# Patient Record
Sex: MALE | Race: WHITE | NOT HISPANIC OR LATINO
[De-identification: names, ages, dates, MRNs, and addresses within clinical notes are randomized per-mention and may not be internally consistent; named-entity substitution may affect disease eponyms.]

---

## 2019-06-04 PROBLEM — Z00.00 ENCOUNTER FOR PREVENTIVE HEALTH EXAMINATION: Status: ACTIVE | Noted: 2019-06-04

## 2019-06-19 ENCOUNTER — APPOINTMENT (OUTPATIENT)
Dept: ORTHOPEDIC SURGERY | Facility: CLINIC | Age: 39
End: 2019-06-19

## 2023-04-05 ENCOUNTER — APPOINTMENT (OUTPATIENT)
Dept: ORTHOPEDIC SURGERY | Facility: CLINIC | Age: 43
End: 2023-04-05
Payer: COMMERCIAL

## 2023-04-05 VITALS
DIASTOLIC BLOOD PRESSURE: 95 MMHG | BODY MASS INDEX: 35.21 KG/M2 | SYSTOLIC BLOOD PRESSURE: 152 MMHG | HEART RATE: 90 BPM | WEIGHT: 260 LBS | HEIGHT: 72 IN

## 2023-04-05 PROCEDURE — 73564 X-RAY EXAM KNEE 4 OR MORE: CPT | Mod: LT

## 2023-04-05 PROCEDURE — 99203 OFFICE O/P NEW LOW 30 MIN: CPT

## 2023-04-05 NOTE — PHYSICAL EXAM
[de-identified] : GENERAL APPEARANCE: Well nourished and hydrated, pleasant, alert, and oriented x 3. Appears their stated age. \par HEENT: Normocephalic, extraocular eye motion intact. Nasal septum midline. Oral cavity clear. External auditory canal clear. \par RESPIRATORY: Breath sounds clear and audible in all lobes. No wheezing, No accessory muscle use.\par CARDIOVASCULAR: No apparent abnormalities. No lower leg edema. No varicosities. Pedal pulses are palpable.\par NEUROLOGIC: Sensation is normal, no muscle weakness in the upper or lower extremities.\par DERMATOLOGIC: No apparent skin lesions, moist, warm, no rash.\par SPINE: Cervical spine appears normal and moves freely; thoracic spine appears normal and moves freely; lumbosacral spine appears normal and moves freely, normal, nontender.\par MUSCULOSKELETAL: Hands, wrists, and elbows are normal and move freely, shoulders are normal and move freely. \par Psychiatric: Oriented to person, place, and time, insight and judgement were intact and the affect was normal. \par Musculoskeletal:. Left knee exam shows no effusion, ROM is 0-1 30 degrees, no instability, no pain with Alonzo, no joint line tenderness.  There is a palpable mass approximately 2 x 2 cm on the medial aspect of the knee that is painful to the touch.  No skin changes.  No erythema.\par 5/5 motor strength in bilateral lower extremities. Sensory: Intact in bilateral lower extremities. DTRs: Biceps, brachioradialis, triceps, patellar, ankle and plantar 2+ and symmetric bilaterally. Pulses: dorsalis pedis, posterior tibial, femoral, popliteal, and radial 2+ and symmetric bilaterally. \par Constitutional: Alert and in no acute distress, but well-appearing.  [de-identified] : 4 views left knee obtained the office today show no acute fracture or dislocation.  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  Nonaggressive appearance.

## 2023-04-05 NOTE — HISTORY OF PRESENT ILLNESS
[Stable] : stable [0] : a maximum pain level of 0/10 [Rarely] : ~He/She~ states the symptoms seem to be rarely occuring [Direct Pressure] : worsened by direct pressure [de-identified] : 42 year old male with past medical attention presents to office for initial evaluation of a tender palpable growth on the medial aspect of his left knee that has been present for many years.  Patient states that this does not cause any dysfunction to his life but has been ongoing long enough that he would like to get it taken care of.  He is able to walk, navigate stairs, stand is flex his knee and go about his activities of daily living without any issue.  He has discomfort when he bangs the growth or applies direct pressure.  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  The patient does not take any medication as he does not have any pain, denies use of assistive device for ambulation, denies any recent injuries falls or trauma, numbness/tingling, swelling, locking catching or buckling of the knee. \par \par Admits to smoking half pack of cigarettes a day and denies any drug or alcohol use.

## 2023-04-05 NOTE — DISCUSSION/SUMMARY
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  He had this lesion for many years however it is painful to the touch and is causing him significant distress at this time.  He would like to have it excised and I do think that is indicated.  I have therefore given him a referral over to the orthopedic oncology service and Dr. Marcial Shabazz for further evaluation and management and removal of this nerve sheath tumor.  Patient understands and agreement.  He will take Tylenol and NSAIDs as needed for any pain.  I will see him back on an as-needed basis for his left knee.  All questions asked and answered

## 2023-04-20 ENCOUNTER — APPOINTMENT (OUTPATIENT)
Dept: ORTHOPEDIC SURGERY | Facility: CLINIC | Age: 43
End: 2023-04-20
Payer: COMMERCIAL

## 2023-04-20 PROCEDURE — 99214 OFFICE O/P EST MOD 30 MIN: CPT

## 2023-04-21 NOTE — PHYSICAL EXAM
[FreeTextEntry1] : UponOn exam patient stands in good balance.  He has a 1/2 to 2 cm mass under the skin at the anteromedial portion of the right knee below the patella.  Tenderness with palpation.  Negative Tinel's with regard to shooting type pain or any radiation.  It is more of an aching type pain.  He has no popliteal or inguinal lymphadenopathy.  His range of motion is normal he is otherwise neurovascular intact.  He has no other masses elsewhere. [General Appearance - Well-Appearing] : Well appearing [General Appearance - Well Nourished] : well nourished [Oriented To Time, Place, And Person] : Oriented to person, place, and time [Impaired Insight] : Insight and judgment were intact [Affect] : The affect was normal. [Sclera] : the sclera and conjunctiva were normal [Mood] : the mood was normal [Neck Cervical Mass (___cm)] : no neck mass was observed [Heart Rate And Rhythm] : heart rate was normal and rhythm regular [] : No respiratory distress [Normal Station and Gait] : gait and station were normal [Abdomen Soft] : Soft [Tenderness] : tenderness [Swelling] : swelling [Masses] : masses [Skin Changes - Describe changes:] : No skin changes noted [Full ROM Unless otherwise noted:] : Full range of motion unless otherwise noted: [LE  Motor Strength Normal unless otherwise noted:] : 5/5 strength in bilateral lower extemities unless otherwise noted. [Normal] : Sensation intact to light touch. [2+] : left 2+

## 2023-04-21 NOTE — DISCUSSION/SUMMARY
[Surgical risks reviewed] : Surgical risks reviewed [All Questions Answered] : Patient (and family) had all questions answered to an agreeable level of satisfaction [Interested in Proceeding] : Patient (and family) expressed understanding and interest in proceeding with the plan as outlined [de-identified] : Patient is a painful mass in this area.  This could be a neurofibroma versus a schwannoma.  Also could be a leiomyoma versus fibrolipoma or other benign soft tissue mass.  There is also chance of malignancy.  I think this is a low chance as this has been there for many years with minimal change.  We discussed we can do this with biopsy and resection at the same time assuming that it appears benign.  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  As we have time and his MRI is 8 months old I will get a new MRI and this was ordered already.  Follow-up again in time for surgery.\par \par If imaging was ordered, the patient was told to make an appointment to review findings right after all imaging is completed.\par \par We discussed risks, benefits and alternatives. Rationale of care was reviewed and all questions were answered. Patient (and family) had all questions answered to her degree of the level of satisfaction. Patient (and family) expressed understanding and interest in proceeding with the plan as outlined.\par \par \par \par \par This note was done with a voice recognition transcription software and any typos are related to this rather than medical error. Surgical risks reviewed. Patient (and family) had all questions answered to an agreeable level of satisfaction. Patient (and family) expressed understanding and interest in proceeding with the plan as outlined.  \par

## 2023-04-21 NOTE — REVIEW OF SYSTEMS
[Feeling Tired] : not feeling tired [Joint Pain] : joint pain [Joint Stiffness] : no joint stiffness [Joint Swelling] : no joint swelling [Nl] : Hematologic/Lymphatic

## 2023-04-21 NOTE — HISTORY OF PRESENT ILLNESS
[FreeTextEntry1] : This is a 42-year-old gentleman who has known about a mass in the front of his left knee for approximately 6 or 7 years.  He had an MRI back in 2019 as well as another one in August 2022 prior he thinks the mass is slightly grown in size.  It does cause significant pain when it is pushed.  He has negative radiation of the pain.  It is slowly been bothering him for significant amount of time however he never got it dealt with because of timing as well as because of COVID.  He is now here for further evaluation.  It does bother him when he pushes on it and when he goes down onto his knees.  He does not want invention which similarly because of it.  When it is not touched in general it is not as painful. [___ yrs] : [unfilled] year(s) ago [5] : currently ~his/her~ pain is 5 out of 10 [Bending] : worsened by bending [None] : No relieving factors are noted

## 2023-04-21 NOTE — DATA REVIEWED
[de-identified] : X-rays reviewed from a few weeks ago bilateral knees show minimal degenerative changes.  There is no soft tissue shadow or any calcifications in the area of concern.  MRI scan from 8/29/2022 compared to that from June 2019 shows a 1.5 x 1 cm mass in the anteromedial deep subcutaneous tissues.  This is slightly growing compared to before.  This was thought to be most consistent with a nerve sheath tumor [Imaging Present] : Present

## 2023-06-14 ENCOUNTER — APPOINTMENT (OUTPATIENT)
Dept: ORTHOPEDIC SURGERY | Facility: CLINIC | Age: 43
End: 2023-06-14
Payer: COMMERCIAL

## 2023-06-14 PROCEDURE — 99214 OFFICE O/P EST MOD 30 MIN: CPT

## 2023-06-14 NOTE — DATA REVIEWED
[Imaging Present] : Present [de-identified] : MRI from May 9, 2023 shows the same lesion as before.  It is a well-circumscribed heterogeneous enhancing subcutaneous nodule.  It is virtually unchanged since August 20, 2022.

## 2023-06-14 NOTE — DISCUSSION/SUMMARY
[Surgical risks reviewed] : Surgical risks reviewed [All Questions Answered] : Patient (and family) had all questions answered to an agreeable level of satisfaction [Interested in Proceeding] : Patient (and family) expressed understanding and interest in proceeding with the plan as outlined [de-identified] : As we discussed this lesion could be schwannoma versus vascular likely lipoma versus other nonaggressive lesion.  At the size and location with no change over 8 months it is likely benign however he would still do primary wide excision.  We did send this out to get pathologic evaluation.  He is interested in doing surgery.  We will do this in the near future.  We discussed risk of numbness from the infrapatellar branch of the saphenous nerve in the front of the knee anterolaterally.\par \par If imaging was ordered, the patient was told to make an appointment to review findings right after all imaging is completed.\par \par We discussed risks, benefits and alternatives. Rationale of care was reviewed and all questions were answered. Patient (and family) had all questions answered to her degree of the level of satisfaction. Patient (and family) expressed understanding and interest in proceeding with the plan as outlined.\par \par \par \par \par This note was done with a voice recognition transcription software and any typos are related to this rather than medical error. Surgical risks reviewed. Patient (and family) had all questions answered to an agreeable level of satisfaction. Patient (and family) expressed understanding and interest in proceeding with the plan as outlined.  \par

## 2023-06-14 NOTE — HISTORY OF PRESENT ILLNESS
[FreeTextEntry1] : Overall his symptoms are exactly the same.  He has no other issues.  He had his MRI to follow-up between August and May. [Stable] : stable [2] : currently ~his/her~ pain is 2 out of 10

## 2023-06-14 NOTE — PHYSICAL EXAM
[FreeTextEntry1] : UponOn exam patient stands in good balance.  He has a 1/2 to 2 cm mass under the skin at the anteromedial portion of the right knee below the patella.  Tenderness with palpation.  Negative Tinel's with regard to shooting type pain or any radiation.  It is more of an aching type pain.  He has no popliteal or inguinal lymphadenopathy.  His range of motion is normal he is otherwise neurovascular intact.  He has no other masses elsewhere. [General Appearance - Well-Appearing] : Well appearing [Oriented To Time, Place, And Person] : Oriented to person, place, and time [General Appearance - Well Nourished] : well nourished [Impaired Insight] : Insight and judgment were intact [Affect] : The affect was normal. [Mood] : the mood was normal [Sclera] : the sclera and conjunctiva were normal [Neck Cervical Mass (___cm)] : no neck mass was observed [Heart Rate And Rhythm] : heart rate was normal and rhythm regular [] : No respiratory distress [Abdomen Soft] : Soft [Normal Station and Gait] : gait and station were normal [Tenderness] : tenderness [Swelling] : swelling [Masses] : masses [Skin Changes - Describe changes:] : No skin changes noted [Full ROM Unless otherwise noted:] : Full range of motion unless otherwise noted: [LE  Motor Strength Normal unless otherwise noted:] : 5/5 strength in bilateral lower extemities unless otherwise noted. [Normal] : Sensation intact to light touch. [2+] : left 2+

## 2023-06-26 ENCOUNTER — OUTPATIENT (OUTPATIENT)
Dept: OUTPATIENT SERVICES | Facility: HOSPITAL | Age: 43
LOS: 1 days | End: 2023-06-26
Payer: COMMERCIAL

## 2023-06-26 VITALS
SYSTOLIC BLOOD PRESSURE: 145 MMHG | OXYGEN SATURATION: 99 % | RESPIRATION RATE: 16 BRPM | TEMPERATURE: 97 F | WEIGHT: 285.94 LBS | HEIGHT: 72 IN | DIASTOLIC BLOOD PRESSURE: 95 MMHG | HEART RATE: 80 BPM

## 2023-06-26 DIAGNOSIS — D49.2 NEOPLASM OF UNSPECIFIED BEHAVIOR OF BONE, SOFT TISSUE, AND SKIN: ICD-10-CM

## 2023-06-26 DIAGNOSIS — I10 ESSENTIAL (PRIMARY) HYPERTENSION: ICD-10-CM

## 2023-06-26 DIAGNOSIS — B20 HUMAN IMMUNODEFICIENCY VIRUS [HIV] DISEASE: ICD-10-CM

## 2023-06-26 LAB
ALBUMIN SERPL ELPH-MCNC: 4.7 G/DL — SIGNIFICANT CHANGE UP (ref 3.3–5)
ALP SERPL-CCNC: 106 U/L — SIGNIFICANT CHANGE UP (ref 40–120)
ALT FLD-CCNC: 56 U/L — HIGH (ref 4–41)
ANION GAP SERPL CALC-SCNC: 15 MMOL/L — HIGH (ref 7–14)
AST SERPL-CCNC: 28 U/L — SIGNIFICANT CHANGE UP (ref 4–40)
BILIRUB SERPL-MCNC: 0.3 MG/DL — SIGNIFICANT CHANGE UP (ref 0.2–1.2)
BUN SERPL-MCNC: 11 MG/DL — SIGNIFICANT CHANGE UP (ref 7–23)
CALCIUM SERPL-MCNC: 10 MG/DL — SIGNIFICANT CHANGE UP (ref 8.4–10.5)
CHLORIDE SERPL-SCNC: 103 MMOL/L — SIGNIFICANT CHANGE UP (ref 98–107)
CO2 SERPL-SCNC: 24 MMOL/L — SIGNIFICANT CHANGE UP (ref 22–31)
CREAT SERPL-MCNC: 0.94 MG/DL — SIGNIFICANT CHANGE UP (ref 0.5–1.3)
EGFR: 103 ML/MIN/1.73M2 — SIGNIFICANT CHANGE UP
GLUCOSE SERPL-MCNC: 110 MG/DL — HIGH (ref 70–99)
HCT VFR BLD CALC: 47.9 % — SIGNIFICANT CHANGE UP (ref 39–50)
HGB BLD-MCNC: 16.2 G/DL — SIGNIFICANT CHANGE UP (ref 13–17)
MCHC RBC-ENTMCNC: 29.8 PG — SIGNIFICANT CHANGE UP (ref 27–34)
MCHC RBC-ENTMCNC: 33.8 GM/DL — SIGNIFICANT CHANGE UP (ref 32–36)
MCV RBC AUTO: 88.2 FL — SIGNIFICANT CHANGE UP (ref 80–100)
NRBC # BLD: 0 /100 WBCS — SIGNIFICANT CHANGE UP (ref 0–0)
NRBC # FLD: 0 K/UL — SIGNIFICANT CHANGE UP (ref 0–0)
PLATELET # BLD AUTO: 249 K/UL — SIGNIFICANT CHANGE UP (ref 150–400)
POTASSIUM SERPL-MCNC: 3.8 MMOL/L — SIGNIFICANT CHANGE UP (ref 3.5–5.3)
POTASSIUM SERPL-SCNC: 3.8 MMOL/L — SIGNIFICANT CHANGE UP (ref 3.5–5.3)
PROT SERPL-MCNC: 7.7 G/DL — SIGNIFICANT CHANGE UP (ref 6–8.3)
RBC # BLD: 5.43 M/UL — SIGNIFICANT CHANGE UP (ref 4.2–5.8)
RBC # FLD: 12.9 % — SIGNIFICANT CHANGE UP (ref 10.3–14.5)
SODIUM SERPL-SCNC: 142 MMOL/L — SIGNIFICANT CHANGE UP (ref 135–145)
WBC # BLD: 9.13 K/UL — SIGNIFICANT CHANGE UP (ref 3.8–10.5)
WBC # FLD AUTO: 9.13 K/UL — SIGNIFICANT CHANGE UP (ref 3.8–10.5)

## 2023-06-26 PROCEDURE — 93010 ELECTROCARDIOGRAM REPORT: CPT

## 2023-06-26 NOTE — H&P PST ADULT - PROBLEM SELECTOR PLAN 3
Patient instructed to descoy take with a sip of water on the morning of procedure. Patient verbalized understanding.

## 2023-06-26 NOTE — H&P PST ADULT - PROBLEM SELECTOR PLAN 1
Patient tentatively scheduled for biopsy , resection left knee mass 7/5/23.  Pre-op instructions provided. Pt given verbal and written instructions with teach back on chlorhexidine wash and pepcid. Pt verbalized understanding with return demonstration.   SAGAR precautions,

## 2023-06-26 NOTE — H&P PST ADULT - NS SC CAGE ALCOHOL CUT DOWN
Returned Stacy call. Pt unable to swallow omeprazole and levaquin. Will request liquids from MD. Also verbal order given for PT/OT. Pt c/o<100 fever today, HH nurse encouraged increased fluids, ambulation, turn cough deep breathe.    no

## 2023-06-26 NOTE — H&P PST ADULT - NSANTHOSAYNRD_GEN_A_CORE
No. SAGAR screening performed.  STOP BANG Legend: 0-2 = LOW Risk; 3-4 = INTERMEDIATE Risk; 5-8 = HIGH Risk

## 2023-06-26 NOTE — H&P PST ADULT - PROBLEM SELECTOR PLAN 2
Patient instructed to take lisinopril with a sip of water on the morning of procedure. .  Patient Blood pressure at /97 repeat 145/95.  Patient instructed to obtain medical evaluation Copy requested.  Surgeon made aware via email. Patient instructed to take lisinopril with a sip of water on the morning of procedure. .  Patient Blood pressure at /97 repeat 145/95.  Patient instructed to obtain medical evaluation Copy requested.  Surgeon made aware via email.  Comparison EKG requested.

## 2023-06-26 NOTE — H&P PST ADULT - NSICDXFAMILYHX_GEN_ALL_CORE_FT
FAMILY HISTORY:  Father  Still living? Unknown  FH: HTN (hypertension), Age at diagnosis: Age Unknown    Mother  Still living? Unknown  FH: HTN (hypertension), Age at diagnosis: Age Unknown    Grandparent  Still living? Unknown  FHx: stroke, Age at diagnosis: Age Unknown

## 2023-07-05 ENCOUNTER — APPOINTMENT (OUTPATIENT)
Dept: ORTHOPEDIC SURGERY | Facility: HOSPITAL | Age: 43
End: 2023-07-05

## 2023-07-20 ENCOUNTER — APPOINTMENT (OUTPATIENT)
Dept: ORTHOPEDIC SURGERY | Facility: CLINIC | Age: 43
End: 2023-07-20

## 2023-07-22 PROBLEM — I10 ESSENTIAL (PRIMARY) HYPERTENSION: Chronic | Status: ACTIVE | Noted: 2023-06-26

## 2023-08-01 ENCOUNTER — APPOINTMENT (OUTPATIENT)
Dept: ORTHOPEDIC SURGERY | Facility: CLINIC | Age: 43
End: 2023-08-01
Payer: COMMERCIAL

## 2023-08-01 VITALS
SYSTOLIC BLOOD PRESSURE: 137 MMHG | HEIGHT: 72 IN | BODY MASS INDEX: 33.86 KG/M2 | WEIGHT: 250 LBS | HEART RATE: 62 BPM | OXYGEN SATURATION: 96 % | DIASTOLIC BLOOD PRESSURE: 93 MMHG

## 2023-08-01 DIAGNOSIS — D49.2 NEOPLASM OF UNSPECIFIED BEHAVIOR OF BONE, SOFT TISSUE, AND SKIN: ICD-10-CM

## 2023-08-01 DIAGNOSIS — M25.562 PAIN IN LEFT KNEE: ICD-10-CM

## 2023-08-01 PROCEDURE — 99214 OFFICE O/P EST MOD 30 MIN: CPT

## 2023-08-01 NOTE — HISTORY OF PRESENT ILLNESS
[FreeTextEntry1] : Patient is back today planning for surgery.  He canceled surgery last time because of some problems with nervousness.  He has been working with a therapist and is improving.  He is also talking to anesthesiologist at his preadmission testing today.  He has lots of questions. [2] : currently ~his/her~ pain is 2 out of 10

## 2023-08-01 NOTE — DISCUSSION/SUMMARY
[Surgical risks reviewed] : Surgical risks reviewed [All Questions Answered] : Patient (and family) had all questions answered to an agreeable level of satisfaction [Interested in Proceeding] : Patient (and family) expressed understanding and interest in proceeding with the plan as outlined [de-identified] : Had a long discussion with the patient over 45 minutes with this surgery.  He was discussing length of surgery involvement of anesthesia and other such questions.  We had a long discussion about all these different options and everything involved with it.  He is now ready for surgery.  We will plan for surgery next week.  If imaging was ordered, the patient was told to make an appointment to review findings right after all imaging is completed.  We discussed risks, benefits and alternatives. Rationale of care was reviewed and all questions were answered. Patient (and family) had all questions answered to her degree of the level of satisfaction. Patient (and family) expressed understanding and interest in proceeding with the plan as outlined.     This note was done with a voice recognition transcription software and any typos are related to this rather than medical error. Surgical risks reviewed. Patient (and family) had all questions answered to an agreeable level of satisfaction. Patient (and family) expressed understanding and interest in proceeding with the plan as outlined.

## 2023-08-01 NOTE — PHYSICAL EXAM
[FreeTextEntry1] : UponOn exam patient stands in good balance.  He has a 1/2 to 2 cm mass under the skin at the anteromedial portion of the right knee below the patella.  Tenderness with palpation.  Negative Tinel's with regard to shooting type pain or any radiation.  It is more of an aching type pain.  He has no popliteal or inguinal lymphadenopathy.  His range of motion is normal he is otherwise neurovascular intact.  He has no other masses elsewhere. [General Appearance - Well-Appearing] : Well appearing [General Appearance - Well Nourished] : well nourished [Oriented To Time, Place, And Person] : Oriented to person, place, and time [Impaired Insight] : Insight and judgment were intact [Affect] : The affect was normal. [Mood] : the mood was normal [Sclera] : the sclera and conjunctiva were normal [Neck Cervical Mass (___cm)] : no neck mass was observed [Heart Rate And Rhythm] : heart rate was normal and rhythm regular [] : No respiratory distress [Abdomen Soft] : Soft [Normal Station and Gait] : gait and station were normal [Tenderness] : tenderness [Swelling] : swelling [Masses] : masses [Skin Changes - Describe changes:] : No skin changes noted [Full ROM Unless otherwise noted:] : Full range of motion unless otherwise noted: [LE  Motor Strength Normal unless otherwise noted:] : 5/5 strength in bilateral lower extemities unless otherwise noted. [Normal] : Sensation intact to light touch. [2+] : left 2+

## 2023-08-02 ENCOUNTER — OUTPATIENT (OUTPATIENT)
Dept: OUTPATIENT SERVICES | Facility: HOSPITAL | Age: 43
LOS: 1 days | End: 2023-08-02

## 2023-08-02 VITALS
DIASTOLIC BLOOD PRESSURE: 86 MMHG | RESPIRATION RATE: 16 BRPM | HEART RATE: 77 BPM | WEIGHT: 255.07 LBS | TEMPERATURE: 97 F | HEIGHT: 72 IN | SYSTOLIC BLOOD PRESSURE: 133 MMHG | OXYGEN SATURATION: 98 %

## 2023-08-02 DIAGNOSIS — I10 ESSENTIAL (PRIMARY) HYPERTENSION: ICD-10-CM

## 2023-08-02 DIAGNOSIS — D49.2 NEOPLASM OF UNSPECIFIED BEHAVIOR OF BONE, SOFT TISSUE, AND SKIN: ICD-10-CM

## 2023-08-02 DIAGNOSIS — R06.83 SNORING: ICD-10-CM

## 2023-08-02 LAB
ALBUMIN SERPL ELPH-MCNC: 4.7 G/DL — SIGNIFICANT CHANGE UP (ref 3.3–5)
ALP SERPL-CCNC: 100 U/L — SIGNIFICANT CHANGE UP (ref 40–120)
ALT FLD-CCNC: 58 U/L — HIGH (ref 4–41)
ANION GAP SERPL CALC-SCNC: 12 MMOL/L — SIGNIFICANT CHANGE UP (ref 7–14)
AST SERPL-CCNC: 36 U/L — SIGNIFICANT CHANGE UP (ref 4–40)
BILIRUB SERPL-MCNC: 0.2 MG/DL — SIGNIFICANT CHANGE UP (ref 0.2–1.2)
BUN SERPL-MCNC: 15 MG/DL — SIGNIFICANT CHANGE UP (ref 7–23)
CALCIUM SERPL-MCNC: 9.7 MG/DL — SIGNIFICANT CHANGE UP (ref 8.4–10.5)
CHLORIDE SERPL-SCNC: 103 MMOL/L — SIGNIFICANT CHANGE UP (ref 98–107)
CO2 SERPL-SCNC: 24 MMOL/L — SIGNIFICANT CHANGE UP (ref 22–31)
CREAT SERPL-MCNC: 0.88 MG/DL — SIGNIFICANT CHANGE UP (ref 0.5–1.3)
EGFR: 109 ML/MIN/1.73M2 — SIGNIFICANT CHANGE UP
GLUCOSE SERPL-MCNC: 82 MG/DL — SIGNIFICANT CHANGE UP (ref 70–99)
POTASSIUM SERPL-MCNC: 4.1 MMOL/L — SIGNIFICANT CHANGE UP (ref 3.5–5.3)
POTASSIUM SERPL-SCNC: 4.1 MMOL/L — SIGNIFICANT CHANGE UP (ref 3.5–5.3)
PROT SERPL-MCNC: 7.5 G/DL — SIGNIFICANT CHANGE UP (ref 6–8.3)
SODIUM SERPL-SCNC: 139 MMOL/L — SIGNIFICANT CHANGE UP (ref 135–145)

## 2023-08-02 RX ORDER — SODIUM CHLORIDE 9 MG/ML
3 INJECTION INTRAMUSCULAR; INTRAVENOUS; SUBCUTANEOUS EVERY 8 HOURS
Refills: 0 | Status: DISCONTINUED | OUTPATIENT
Start: 2023-08-10 | End: 2023-08-24

## 2023-08-02 RX ORDER — LISINOPRIL 2.5 MG/1
1 TABLET ORAL
Refills: 0 | DISCHARGE

## 2023-08-02 RX ORDER — ACETAMINOPHEN 500 MG
975 TABLET ORAL ONCE
Refills: 0 | Status: DISCONTINUED | OUTPATIENT
Start: 2023-08-10 | End: 2023-08-24

## 2023-08-02 RX ORDER — EMTRICITABINE AND TENOFOVIR DISOPROXIL FUMARATE 200; 300 MG/1; MG/1
1 TABLET, FILM COATED ORAL
Refills: 0 | DISCHARGE

## 2023-08-02 RX ORDER — SODIUM CHLORIDE 9 MG/ML
1000 INJECTION, SOLUTION INTRAVENOUS
Refills: 0 | Status: DISCONTINUED | OUTPATIENT
Start: 2023-08-10 | End: 2023-08-24

## 2023-08-02 NOTE — H&P PST ADULT - PSYCHIATRIC COMMENTS
hx of anxiety attack - states it happened for the first time prior to his initial scheduled surgery and his PCP did not clear him. Pt states he has since been seeing a therapist and is cleared by PCP

## 2023-08-02 NOTE — H&P PST ADULT - NSICDXFAMILYHX_GEN_ALL_CORE_FT
FAMILY HISTORY:  Father  Still living? Unknown  FH: HTN (hypertension), Age at diagnosis: Age Unknown    Grandparent  Still living? Unknown  FH: heart attack, Age at diagnosis: Age Unknown  FHx: stroke, Age at diagnosis: Age Unknown

## 2023-08-02 NOTE — H&P PST ADULT - ENDOCRINE COMMENTS
hx hyperthyroidism -states he was on medication for one year and then he was taken off medication 15 years ago after levels normalized

## 2023-08-02 NOTE — H&P PST ADULT - PROBLEM SELECTOR PLAN 1
Pre-op instructions provided. Pt verbalized understanding.   Pt given detailed verbal and written instructions on chlorhexidine wash. Pt verbalized understanding with teachback.   Pt has questions/anxiety about anesthesia and is insisting on speaking with anesthesia - discussed with Dr. Carcamo who spoke with patient.  Requested copy of medical clearance (pt was cancelled prior due to anxiety/HTN).

## 2023-08-02 NOTE — H&P PST ADULT - ATTENDING COMMENTS
.  .  Surgery was canceled previously d/t panic attack in PCP's clinic. Pt is extremely anxious about anesthesia, specifically the idea of perioperative amnesia. "I'm hyperventilating just thinking about waking up in PACU and not remembering walking to the OR or waking up in the OR and suddenyly being in PACU."  We discussed GA, MAC/local, regional/sedation, neuraxial/sedation, and pt understands that the anesthesia plan will ultimately be decided by the covering anesthesiologist. He felt reassured learning that Midazolam is not necessary and that anesthetic can be tailored to best suit him.    I strongly recommend not using midazolam and minimizing anesthetic during the case to allow for quick emergence in the OR. Pt states today at [facility] is the first time he feels his concerns are being heard, so also strongly recommend patience and comforting him the day of surgery.       Jordan Carcamo M.D.  Department of Anesthesiology  [facility] .  .  Surgery was canceled previously d/t panic attack in PCP's clinic. Pt is extremely anxious about anesthesia, specifically the idea of perioperative amnesia. "I'm hyperventilating just thinking about waking up in PACU and not remembering walking to the OR or waking up in the OR and suddenyly being in PACU."  We discussed GA, MAC/local, regional/sedation, neuraxial/sedation, and pt understands that the anesthesia plan will ultimately be decided by the covering anesthesiologist. He felt reassured learning that Midazolam is not necessary and that anesthetic can be tailored to best suit him.    I strongly recommend not using midazolam and minimizing anesthetic during the case to allow for quick emergence in the OR. Pt states today at [facility] is the first time he feels his concerns are being heard, so also strongly recommend patience and comforting him the day of surgery.       Jordan Carcamo M.D.  Department of Anesthesiology  [facility]      8/10/2023 8:27am day of attestation - ORTHOPAEDIC ONCOLOGY ATTENDING  patient for resetion left knee mass  Risks, benefits and alternatives discussed with patient.  Marcial Shabazz MD  Musculoskeletal Oncology  [phone]

## 2023-08-02 NOTE — H&P PST ADULT - HISTORY OF PRESENT ILLNESS
43 year old male with mass on left knee which has increased in size over the past few years. Pt also reports tenderness to touch. Pt presents today for presurgical evaluation for ..    Pt was scheduled for procedure prior to but pt states he had an anxiety attack and his PCP did not clear him for surgery. Pt states he now is seeing therapist and states he has since been cleared by his PCP.  43 year old male with mass on left knee which has increased in size over the past few years. Pt also reports tenderness to touch. Pt presents today for presurgical evaluation for Biopsy, Resection Left Knee Mass.     Pt was scheduled for procedure last month, but pt states he had an anxiety attack and his PCP did not clear him for surgery. Pt states he now is seeing therapist. Pt also had his blood pressure medication increased by PCP. Pt states he is now cleared by his PCP.  43 year old male with mass on left knee which has increased in size over the past few years. Pt also reports tenderness to touch. Pt presents today for presurgical evaluation for Biopsy, Resection Left Knee Mass.     Pt was scheduled for procedure last month, but pt states he had an anxiety attack and his PCP did not clear him for surgery. Pt states he now is seeing therapist. Pt also had his blood pressure medication increased by PCP. Pt states he is now cleared by his PCP.     See attending anesthesiologist addendum for details re anesthesia d/w pt.

## 2023-08-09 ENCOUNTER — TRANSCRIPTION ENCOUNTER (OUTPATIENT)
Age: 43
End: 2023-08-09

## 2023-08-09 NOTE — ASU PATIENT PROFILE, ADULT - MUTUALITY COMMENT, PROFILE
PAST MEDICAL HISTORY:  Aortic stenosis     Atrial fibrillation     COPD (chronic obstructive pulmonary disease)     COPD (chronic obstructive pulmonary disease)     Coronary artery disease     Diabetes mellitus, type 2     Goiter     HLD (hyperlipidemia)     Hypertension     Hyperthyroidism     Kidney stones     Mitral regurgitation     LIMA (obstructive sleep apnea)     Pericardial effusion drained fluid  a liter of , in 2018    Pulmonary nodules none PAST MEDICAL HISTORY:  Aortic stenosis moderate    Atrial fibrillation s/p Atrial flutter ablation, turned to afib on eliquis    COPD (chronic obstructive pulmonary disease) not on oxygen    COPD (chronic obstructive pulmonary disease)     Coronary artery disease     Diabetes mellitus, type 2 on janumet    Goiter     HLD (hyperlipidemia)     Hypertension     Hyperthyroidism on methimazole    Kidney stones     Mitral regurgitation severe    LIMA (obstructive sleep apnea) refuses C-pap    Pericardial effusion drained  a liter of fluid, in 2018    Pulmonary nodules PAST MEDICAL HISTORY:  Aortic stenosis moderate    Atrial fibrillation s/p Atrial flutter ablation, turned to Afib on Eliquis    COPD (chronic obstructive pulmonary disease) not on oxygen    COPD (chronic obstructive pulmonary disease)     Coronary artery disease     Diabetes mellitus, type 2 on janumet    Goiter     HLD (hyperlipidemia)     Hypertension     Hyperthyroidism on methimazole    Kidney stones     Mitral regurgitation severe    LIMA (obstructive sleep apnea) refuses C-pap    Pericardial effusion drained  a liter of fluid, in 2018    Pulmonary nodules

## 2023-08-10 ENCOUNTER — APPOINTMENT (OUTPATIENT)
Dept: ORTHOPEDIC SURGERY | Facility: HOSPITAL | Age: 43
End: 2023-08-10

## 2023-08-10 ENCOUNTER — RESULT REVIEW (OUTPATIENT)
Age: 43
End: 2023-08-10

## 2023-08-10 ENCOUNTER — TRANSCRIPTION ENCOUNTER (OUTPATIENT)
Age: 43
End: 2023-08-10

## 2023-08-10 ENCOUNTER — OUTPATIENT (OUTPATIENT)
Dept: OUTPATIENT SERVICES | Facility: HOSPITAL | Age: 43
LOS: 1 days | Discharge: HOME | End: 2023-08-10
Payer: COMMERCIAL

## 2023-08-10 VITALS
OXYGEN SATURATION: 98 % | RESPIRATION RATE: 18 BRPM | HEART RATE: 64 BPM | SYSTOLIC BLOOD PRESSURE: 120 MMHG | DIASTOLIC BLOOD PRESSURE: 57 MMHG

## 2023-08-10 VITALS
SYSTOLIC BLOOD PRESSURE: 142 MMHG | RESPIRATION RATE: 16 BRPM | WEIGHT: 255.07 LBS | OXYGEN SATURATION: 99 % | HEART RATE: 81 BPM | TEMPERATURE: 98 F | DIASTOLIC BLOOD PRESSURE: 73 MMHG | HEIGHT: 72 IN

## 2023-08-10 DIAGNOSIS — D49.2 NEOPLASM OF UNSPECIFIED BEHAVIOR OF BONE, SOFT TISSUE, AND SKIN: ICD-10-CM

## 2023-08-10 PROCEDURE — 27329 RESECT THIGH/KNEE TUM < 5 CM: CPT | Mod: LT

## 2023-08-10 PROCEDURE — 88305 TISSUE EXAM BY PATHOLOGIST: CPT | Mod: 26

## 2023-08-10 RX ORDER — LISINOPRIL 2.5 MG/1
1 TABLET ORAL
Refills: 0 | DISCHARGE

## 2023-08-10 RX ORDER — EMTRICITABINE AND TENOFOVIR DISOPROXIL FUMARATE 200; 300 MG/1; MG/1
1 TABLET, FILM COATED ORAL
Refills: 0 | DISCHARGE

## 2023-08-10 NOTE — BRIEF OPERATIVE NOTE - NSICDXBRIEFPROCEDURE_GEN_ALL_CORE_FT
PROCEDURES:  Excision, schwannoma, nerve, peripheral 10-Aug-2023 09:48:23 left anterior knee lesion removal [place]

## 2023-08-10 NOTE — PACU DISCHARGE NOTE - NAUSEA/VOMITING:
None [Alert] : alert [Acute Distress] : no acute distress [Normocephalic] : normocephalic [Flat Open Anterior [place]] : flat open anterior fontanelle [Red Reflex] : red reflex bilateral [PERRL] : PERRL [Normally Placed Ears] : normally placed ears [Auricles Well Formed] : auricles well formed [Clear Tympanic membranes] : clear tympanic membranes [Light reflex present] : light reflex present [Bony landmarks visible] : bony landmarks visible [Discharge] : no discharge [Nares Patent] : nares patent [Palate Intact] : palate intact [Uvula Midline] : uvula midline [Palpable Masses] : no palpable masses [Symmetric Chest Rise] : symmetric chest rise [Clear to Auscultation Bilaterally] : clear to auscultation bilaterally [Regular Rate and Rhythm] : regular rate and rhythm [S1, S2 present] : S1, S2 present [Murmurs] : no murmurs [+2 Femoral Pulses] : (+) 2 femoral pulses [Soft] : soft [Tender] : nontender [Distended] : nondistended [Bowel Sounds] : bowel sounds present [Hepatomegaly] : no hepatomegaly [Splenomegaly] : no splenomegaly [External Genitalia] : normal external genitalia [Clitoromegaly] : no clitoromegaly [Normal Vaginal Introitus] : normal vaginal introitus [Patent] : patent [Normally Placed] : normally placed [No Abnormal Lymph Nodes Palpated] : no abnormal lymph nodes palpated [Hoang-Ortolani] : negative Hoang-Ortolani [Allis Sign] : negative Allis sign [Spinal Dimple] : no spinal dimple [Tuft of Hair] : no tuft of hair [Startle Reflex] : startle reflex present [Plantar Grasp] : plantar grasp reflex present [Symmetric Frank] : symmetric frank [Rash or Lesions] : no rash/lesions

## 2023-08-10 NOTE — ASU DISCHARGE PLAN (ADULT/PEDIATRIC) - ASU DC SPECIAL INSTRUCTIONSFT
Follow up with Dr Shabazz in 7-10 days. Call office for appointment. Take medications as prescribed. Keep dressing clean, dry, and intact. Rest, ice, and elevate affected extremity.

## 2023-08-10 NOTE — ASU DISCHARGE PLAN (ADULT/PEDIATRIC) - CARE PROVIDER_API CALL
Marcial Shabazz  Orthopaedic Surgery  [address] [facility], [address]  [place] [address]  Phone: [phone]  Fax: [phone]  Follow Up Time:

## 2023-08-12 ENCOUNTER — TRANSCRIPTION ENCOUNTER (OUTPATIENT)
Age: 43
End: 2023-08-12

## 2023-08-23 ENCOUNTER — APPOINTMENT (OUTPATIENT)
Dept: ORTHOPEDIC SURGERY | Facility: CLINIC | Age: 43
End: 2023-08-23
Payer: COMMERCIAL

## 2023-08-23 VITALS
BODY MASS INDEX: 32.47 KG/M2 | OXYGEN SATURATION: 97 % | DIASTOLIC BLOOD PRESSURE: 80 MMHG | HEIGHT: 73 IN | SYSTOLIC BLOOD PRESSURE: 132 MMHG | WEIGHT: 245 LBS | HEART RATE: 91 BPM

## 2023-08-23 DIAGNOSIS — D21.9 BENIGN NEOPLASM OF CONNECTIVE AND OTHER SOFT TISSUE, UNSPECIFIED: ICD-10-CM

## 2023-08-23 PROCEDURE — 99024 POSTOP FOLLOW-UP VISIT: CPT

## 2023-09-06 PROBLEM — D21.9: Status: ACTIVE | Noted: 2023-09-06

## 2023-09-06 NOTE — HISTORY OF PRESENT ILLNESS
[___ Weeks Post Op] : [unfilled] weeks post op [0] : no pain reported [Swelling] : not swollen [Neuro Intact] : an unremarkable neurological exam [Vascular Intact] : ~T peripheral vascular exam normal [Negative Estrella's] : maneuvers demonstrated a negative Estrella's sign [de-identified] : 8/10/2023 - Resection left knee mass [de-identified] : Patient is doing very well postop.  He has no pain at all.  He is able to move appropriately. [de-identified] : On exam his incision is clean dry and intact.  He has normal sensation throughout.  He has minimal if any tenderness at the site itself. 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  He can go back to regular activity.  He can follow-up with me again as needed.  We discussed that these are benign findings and do not need any other further work-up. [de-identified] :   If imaging was ordered, the patient was told to make an appointment to review findings right after all imaging is completed.  We discussed risks, benefits and alternatives. Rationale of care was reviewed and all questions were answered. Patient (and family) had all questions answered to her degree of the level of satisfaction. Patient (and family) expressed understanding and interest in proceeding with the plan as outlined.     This note was done with a voice recognition transcription software and any typos are related to this rather than medical error. Surgical risks reviewed. Patient (and family) had all questions answered to an agreeable level of satisfaction. Patient (and family) expressed understanding and interest in proceeding with the plan as outlined.

## 2025-08-12 ENCOUNTER — APPOINTMENT (OUTPATIENT)
Dept: ORTHOPEDIC SURGERY | Facility: CLINIC | Age: 45
End: 2025-08-12
Payer: COMMERCIAL

## 2025-08-12 VITALS — WEIGHT: 220 LBS | BODY MASS INDEX: 29.8 KG/M2 | HEIGHT: 72 IN

## 2025-08-12 DIAGNOSIS — Z83.3 FAMILY HISTORY OF DIABETES MELLITUS: ICD-10-CM

## 2025-08-12 DIAGNOSIS — Z82.49 FAMILY HISTORY OF ISCHEMIC HEART DISEASE AND OTHER DISEASES OF THE CIRCULATORY SYSTEM: ICD-10-CM

## 2025-08-12 DIAGNOSIS — S86.111A STRAIN OF OTHER MUSCLE(S) AND TENDON(S) OF POSTERIOR MUSCLE GROUP AT LOWER LEG LEVEL, RIGHT LEG, INITIAL ENCOUNTER: ICD-10-CM

## 2025-08-12 DIAGNOSIS — I10 ESSENTIAL (PRIMARY) HYPERTENSION: ICD-10-CM

## 2025-08-12 PROCEDURE — 99203 OFFICE O/P NEW LOW 30 MIN: CPT

## 2025-08-12 RX ORDER — MELOXICAM 15 MG/1
15 TABLET ORAL
Qty: 30 | Refills: 0 | Status: ACTIVE | COMMUNITY
Start: 2025-08-12 | End: 2025-09-11

## 2025-08-12 RX ORDER — LISINOPRIL 30 MG/1
TABLET ORAL
Refills: 0 | Status: ACTIVE | COMMUNITY

## 2025-09-02 ENCOUNTER — APPOINTMENT (OUTPATIENT)
Dept: ORTHOPEDIC SURGERY | Facility: CLINIC | Age: 45
End: 2025-09-02

## (undated) DEVICE — GLV 8 PROTEXIS (CREAM) MICRO

## (undated) DEVICE — DRSG XEROFORM 5 X 9"

## (undated) DEVICE — POSITIONER STRAP ARMBOARD VELCRO TS-30

## (undated) DEVICE — TAPE SILK 3"

## (undated) DEVICE — TRAP SPECIMEN SPUTUM 40CC

## (undated) DEVICE — ELCTR BOVIE PENCIL SMOKE EVACUATION

## (undated) DEVICE — DRSG COBAN 4"

## (undated) DEVICE — DRSG STOCKINETTE IMPERVIOUS XL

## (undated) DEVICE — DRAPE U POLY BLUE 60"X60"

## (undated) DEVICE — DRAPE 3/4 SHEET 52X76"

## (undated) DEVICE — SUT ETHILON 2-0 18" FS

## (undated) DEVICE — SUT VICRYL 0 27" OS-6 UNDYED

## (undated) DEVICE — CONNECTOR 5 IN1 SUCTION TUBING

## (undated) DEVICE — DRSG TELFA 3 X 8

## (undated) DEVICE — DRAPE IOBAN 23" X 23"

## (undated) DEVICE — VENODYNE/SCD SLEEVE CALF MEDIUM

## (undated) DEVICE — PREP CHLORAPREP HI-LITE ORANGE 26ML

## (undated) DEVICE — SUT VICRYL 2-0 27" CP-1 UNDYED

## (undated) DEVICE — PROTECTOR HEEL / ELBOW FLUFFY

## (undated) DEVICE — PACK LIJ BASIC ORTHO

## (undated) DEVICE — DRSG COBAN COMPRESSION SYSTEM 2 LAYER

## (undated) DEVICE — ELCTR BOVIE TIP NEEDLE INSULATED 2.8" EDGE

## (undated) DEVICE — DRAPE COVER SNAP 36X30"

## (undated) DEVICE — WARMING BLANKET LOWER ADULT

## (undated) DEVICE — TOURNIQUET ESMARK 6"